# Patient Record
Sex: MALE | Race: BLACK OR AFRICAN AMERICAN | NOT HISPANIC OR LATINO | URBAN - METROPOLITAN AREA
[De-identification: names, ages, dates, MRNs, and addresses within clinical notes are randomized per-mention and may not be internally consistent; named-entity substitution may affect disease eponyms.]

---

## 2023-11-10 PROBLEM — M25.512 CHRONIC LEFT SHOULDER PAIN: Status: ACTIVE | Noted: 2018-03-14

## 2023-11-10 PROBLEM — H02.106 ECTROPION OF LEFT EYE: Status: ACTIVE | Noted: 2023-11-10

## 2023-11-10 PROBLEM — G89.29 CHRONIC LEFT SHOULDER PAIN: Status: ACTIVE | Noted: 2018-03-14

## 2023-11-10 PROBLEM — E66.01 MORBID OBESITY (MULTI): Status: ACTIVE | Noted: 2023-11-10

## 2023-11-10 PROBLEM — S99.921A RIGHT FOOT INJURY: Status: ACTIVE | Noted: 2023-11-10

## 2023-11-10 PROBLEM — H25.11 AGE-RELATED NUCLEAR CATARACT OF RIGHT EYE: Status: ACTIVE | Noted: 2023-11-10

## 2023-11-10 PROBLEM — E66.813 OBESITY, CLASS III, BMI 40-49.9 (MORBID OBESITY): Status: ACTIVE | Noted: 2018-07-23

## 2023-11-10 PROBLEM — E66.01 OBESITY, CLASS III, BMI 40-49.9 (MORBID OBESITY) (MULTI): Status: ACTIVE | Noted: 2018-07-23

## 2023-11-10 PROBLEM — I10 HYPERTENSION: Status: ACTIVE | Noted: 2023-11-10

## 2023-11-10 PROBLEM — S92.009A CALCANEUS FRACTURE: Status: ACTIVE | Noted: 2023-11-10

## 2023-11-10 PROBLEM — K29.70 GASTRITIS WITHOUT BLEEDING: Status: ACTIVE | Noted: 2017-12-07

## 2023-11-10 PROBLEM — H25.12 AGE-RELATED NUCLEAR CATARACT OF LEFT EYE: Status: ACTIVE | Noted: 2023-11-10

## 2023-11-10 PROBLEM — R07.9 CHEST PAIN: Status: ACTIVE | Noted: 2019-05-21

## 2023-11-10 PROBLEM — H10.33 ACUTE CONJUNCTIVITIS OF BOTH EYES: Status: ACTIVE | Noted: 2023-11-10

## 2023-11-10 PROBLEM — L24.0 CONTACT DERMATITIS DUE TO DETERGENT: Status: ACTIVE | Noted: 2023-11-10

## 2023-11-10 PROBLEM — Q14.1 CONGENITAL HYPERTROPHY OF RETINAL PIGMENT EPITHELIUM: Status: ACTIVE | Noted: 2023-11-10

## 2023-11-10 PROBLEM — J01.90 ACUTE NON-RECURRENT SINUSITIS: Status: ACTIVE | Noted: 2023-11-10

## 2023-11-10 PROBLEM — H11.002 PTERYGIUM EYE, LEFT: Status: ACTIVE | Noted: 2017-10-31

## 2023-11-10 PROBLEM — S93.401A RIGHT ANKLE SPRAIN: Status: ACTIVE | Noted: 2023-11-10

## 2023-11-10 PROBLEM — M10.9 GOUT: Status: ACTIVE | Noted: 2017-09-28

## 2023-11-10 PROBLEM — S99.911A INJURY OF RIGHT ANKLE: Status: ACTIVE | Noted: 2023-11-10

## 2023-11-10 RX ORDER — CLONIDINE HYDROCHLORIDE 0.3 MG/1
0.3 TABLET ORAL 2 TIMES DAILY
COMMUNITY
Start: 2023-10-25

## 2023-11-10 RX ORDER — VIT C/E/ZN/COPPR/LUTEIN/ZEAXAN 250MG-90MG
1000 CAPSULE ORAL
COMMUNITY
Start: 2023-10-25

## 2023-11-10 RX ORDER — ASCORBIC ACID 500 MG
500 TABLET ORAL
COMMUNITY
Start: 2023-10-25

## 2023-11-10 RX ORDER — POTASSIUM CHLORIDE 20 MEQ/1
20 TABLET, EXTENDED RELEASE ORAL 2 TIMES DAILY
COMMUNITY
Start: 2020-02-21

## 2023-11-10 RX ORDER — HYDROCHLOROTHIAZIDE 50 MG/1
50 TABLET ORAL
COMMUNITY
Start: 2016-11-22

## 2023-11-10 RX ORDER — ASPIRIN 81 MG/1
81 TABLET ORAL
COMMUNITY
Start: 2017-09-28

## 2023-11-10 RX ORDER — AMLODIPINE BESYLATE 10 MG/1
10 TABLET ORAL
COMMUNITY
Start: 2015-10-15

## 2023-11-13 ENCOUNTER — APPOINTMENT (OUTPATIENT)
Dept: SLEEP MEDICINE | Facility: CLINIC | Age: 63
End: 2023-11-13
Payer: COMMERCIAL

## 2024-04-20 ENCOUNTER — HOSPITAL ENCOUNTER (OUTPATIENT)
Dept: RADIOLOGY | Facility: CLINIC | Age: 64
Discharge: HOME | End: 2024-04-20
Payer: COMMERCIAL

## 2024-04-20 DIAGNOSIS — M54.31 BILATERAL SCIATICA: ICD-10-CM

## 2024-04-20 DIAGNOSIS — M54.32 BILATERAL SCIATICA: ICD-10-CM

## 2024-04-20 PROCEDURE — 72100 X-RAY EXAM L-S SPINE 2/3 VWS: CPT | Performed by: RADIOLOGY

## 2024-04-20 PROCEDURE — 72100 X-RAY EXAM L-S SPINE 2/3 VWS: CPT

## 2024-10-27 ENCOUNTER — OFFICE VISIT (OUTPATIENT)
Dept: URGENT CARE | Age: 64
End: 2024-10-27
Payer: COMMERCIAL

## 2024-10-27 VITALS
OXYGEN SATURATION: 96 % | TEMPERATURE: 97.9 F | DIASTOLIC BLOOD PRESSURE: 73 MMHG | SYSTOLIC BLOOD PRESSURE: 135 MMHG | RESPIRATION RATE: 17 BRPM | HEART RATE: 63 BPM

## 2024-10-27 DIAGNOSIS — G89.29 CHRONIC LEFT-SIDED LOW BACK PAIN WITH LEFT-SIDED SCIATICA: Primary | ICD-10-CM

## 2024-10-27 DIAGNOSIS — M54.42 CHRONIC LEFT-SIDED LOW BACK PAIN WITH LEFT-SIDED SCIATICA: Primary | ICD-10-CM

## 2024-10-27 PROCEDURE — 3075F SYST BP GE 130 - 139MM HG: CPT

## 2024-10-27 PROCEDURE — 99213 OFFICE O/P EST LOW 20 MIN: CPT

## 2024-10-27 PROCEDURE — 3078F DIAST BP <80 MM HG: CPT

## 2024-10-27 RX ORDER — CYCLOBENZAPRINE HCL 10 MG
10 TABLET ORAL NIGHTLY PRN
Qty: 30 TABLET | Refills: 0 | Status: SHIPPED | OUTPATIENT
Start: 2024-10-27 | End: 2024-12-26

## 2024-10-27 ASSESSMENT — ENCOUNTER SYMPTOMS
RESPIRATORY NEGATIVE: 1
ARTHRALGIAS: 1
ACTIVITY CHANGE: 1
LEG PAIN: 1
CARDIOVASCULAR NEGATIVE: 1
BACK PAIN: 1

## 2024-10-27 ASSESSMENT — PAIN SCALES - GENERAL: PAINLEVEL_OUTOF10: 0-NO PAIN

## 2024-10-27 NOTE — PROGRESS NOTES
Subjective   Patient ID: Gustabo Prasad is a 64 y.o. male. They present today with a chief complaint of Leg Pain.    History of Present Illness  A 64-year-old male with multiple comorbidities arrives to the clinic with chief complaint of left-sided lower back pain and leg pain.  The patient reports that this has been ongoing for the last month.  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  He states that when he would sit down he can feel some jolting pains from his left lower back down to his left leg.  He states that is numb and tingling.  When he stands up, he feels a lot better.  He has been using over-the-counter medications with minimal relief.  He reports a history of this and would use his muscle ache machine to help with the spasms.  He reports the last and this happened, he was prescribed Flexeril.  He is here for further evaluation health maintenance.  Leg Pain         Past Medical History  Allergies as of 10/27/2024 - Reviewed 10/27/2024   Allergen Reaction Noted    Valsartan Anaphylaxis 11/18/2015    Citric acid Dermatitis 11/10/2023    Sulfa (sulfonamide antibiotics) Itching 09/26/2013    Citrus and derivatives Rash 11/29/2011       (Not in a hospital admission)       History reviewed. No pertinent past medical history.    History reviewed. No pertinent surgical history.     reports that he has been smoking. He does not have any smokeless tobacco history on file.    Review of Systems  Review of Systems   Constitutional:  Positive for activity change.   HENT: Negative.     Respiratory: Negative.     Cardiovascular: Negative.    Musculoskeletal:  Positive for arthralgias and back pain.       Objective    Vitals:    10/27/24 1751   BP: 135/73   BP Location: Left arm   Patient Position: Sitting   Pulse: 63   Resp: 17   Temp: 36.6 °C (97.9 °F)   TempSrc: Oral   SpO2: 96%     No LMP  for male patient.    Physical Exam  Vitals and nursing note reviewed.   Constitutional:       Appearance: Normal appearance.   HENT:      Head: Normocephalic and atraumatic.      Right Ear: Tympanic membrane normal.      Left Ear: Tympanic membrane normal.      Nose: Nose normal.      Mouth/Throat:      Mouth: Mucous membranes are moist.      Pharynx: Oropharynx is clear.   Eyes:      Extraocular Movements: Extraocular movements intact.      Conjunctiva/sclera: Conjunctivae normal.      Pupils: Pupils are equal, round, and reactive to light.   Cardiovascular:      Rate and Rhythm: Normal rate and regular rhythm.   Pulmonary:      Effort: Pulmonary effort is normal.      Breath sounds: Normal breath sounds.   Abdominal:      General: Bowel sounds are normal.      Palpations: Abdomen is soft.   Musculoskeletal:         General: Normal range of motion.      Cervical back: Normal range of motion and neck supple.   Skin:     General: Skin is warm.      Capillary Refill: Capillary refill takes less than 2 seconds.   Neurological:      General: No focal deficit present.      Mental Status: He is alert and oriented to person, place, and time. Mental status is at baseline.   Psychiatric:         Mood and Affect: Mood normal.         Behavior: Behavior normal.         Thought Content: Thought content normal.         Judgment: Judgment normal.         Procedures    Point of Care Test & Imaging Results from this visit  No results found for this visit on 10/27/24.   No results found.    Diagnostic study results (if any) were reviewed by DERECK Brandon.    Assessment/Plan   Allergies, medications, history, and pertinent labs/EKGs/Imaging reviewed by DERECK Brandon.     Medical Decision Making  S/s of lumbago with sciatica to the left side. No trauma. Likely due to the stagnant desk job, heavy weight, and heavy lifting at home. Stretching exercises as discussed. Otc tylenol and motrin.  Flexeril 10mg at HS  for muscle spasms, follow up with pcp.    As a result of the work-up, the patient was discharged home.  he was informed of his diagnosis and instructed to come back with any concerns or worsening of condition.  he and was agreeable to the plan as discussed above.  he was given the opportunity to ask questions.  All of the patient's questions were answered.    This document was generated using the assistance of voice recognition software. If there are any errors of spelling, grammar, syntax, or meaning; please feel free to contact me directly for clarification.     Orders and Diagnoses  Diagnoses and all orders for this visit:  Chronic left-sided low back pain with left-sided sciatica  -     cyclobenzaprine (Flexeril) 10 mg tablet; Take 1 tablet (10 mg) by mouth as needed at bedtime for muscle spasms.      Medical Admin Record      Patient disposition: Home    Electronically signed by DERECK Brandon  6:08 PM

## 2025-05-18 ENCOUNTER — OFFICE VISIT (OUTPATIENT)
Dept: URGENT CARE | Age: 65
End: 2025-05-18
Payer: COMMERCIAL

## 2025-05-18 VITALS
SYSTOLIC BLOOD PRESSURE: 174 MMHG | RESPIRATION RATE: 20 BRPM | DIASTOLIC BLOOD PRESSURE: 90 MMHG | OXYGEN SATURATION: 97 % | TEMPERATURE: 98 F | HEART RATE: 68 BPM

## 2025-05-18 DIAGNOSIS — I10 HYPERTENSION, UNSPECIFIED TYPE: ICD-10-CM

## 2025-05-18 DIAGNOSIS — R10.9 ABDOMINAL PAIN, UNSPECIFIED ABDOMINAL LOCATION: ICD-10-CM

## 2025-05-18 DIAGNOSIS — S39.011A STRAIN OF ABDOMINAL WALL, INITIAL ENCOUNTER: Primary | ICD-10-CM

## 2025-05-18 LAB
POC APPEARANCE, URINE: CLEAR
POC BILIRUBIN, URINE: NEGATIVE
POC BLOOD, URINE: NEGATIVE
POC COLOR, URINE: ABNORMAL
POC GLUCOSE, URINE: NEGATIVE MG/DL
POC KETONES, URINE: NEGATIVE MG/DL
POC LEUKOCYTES, URINE: ABNORMAL
POC NITRITE,URINE: NEGATIVE
POC PH, URINE: 6 PH
POC PROTEIN, URINE: NEGATIVE MG/DL
POC SPECIFIC GRAVITY, URINE: 1.01
POC UROBILINOGEN, URINE: 0.2 EU/DL

## 2025-05-18 ASSESSMENT — PAIN SCALES - GENERAL: PAINLEVEL_OUTOF10: 6

## 2025-05-18 NOTE — PROGRESS NOTES
Subjective   Patient ID: Gustabo Prasad is a 64 y.o. male. They present today with a chief complaint of Abdominal Pain (For one week).    History of Present Illness  Patient reports that he's been wearing a abdominal safety belt for the past ~2 weeks  Notes discomfort - inferior & left to the belly button - past week  Reports symptoms are worse when he flexes his abdomen forward  Denies nausea, vomiting  Denies dysuria  Notes sometimes having inconsistent BM, prune juice typically helps  Doesn't take his blood pressure medication every day because he doesn't like how it makes him feel      Past Medical History  Allergies as of 05/18/2025 - Reviewed 05/18/2025   Allergen Reaction Noted    Valsartan Anaphylaxis 11/18/2015    Citric acid Dermatitis 11/10/2023    Sulfa (sulfonamide antibiotics) Itching 09/26/2013    Citrus and derivatives Rash 11/29/2011       Prescriptions Prior to Admission[1]     Medical History[2]    Surgical History[3]     reports that he has been smoking. He does not have any smokeless tobacco history on file.                               Objective    Vitals:    05/18/25 1921   BP: 174/90   BP Location: Left arm   Patient Position: Sitting   Pulse: 68   Resp: 20   Temp: 36.7 °C (98 °F)   TempSrc: Oral   SpO2: 97%     No LMP for male patient.    Physical Exam  Constitutional:       General: He is not in acute distress.     Appearance: Normal appearance. He is not toxic-appearing or diaphoretic.   HENT:      Nose: No rhinorrhea.   Eyes:      General: No scleral icterus.        Right eye: No discharge.         Left eye: No discharge.      Extraocular Movements: Extraocular movements intact.   Pulmonary:      Effort: Pulmonary effort is normal.   Abdominal:      General: There is no distension.      Palpations: Abdomen is soft. There is no mass.      Tenderness: There is no guarding.       Musculoskeletal:      Cervical back: Normal range of motion.   Neurological:      Mental Status: He is alert.    Psychiatric:         Mood and Affect: Mood normal.         Behavior: Behavior normal.         Thought Content: Thought content normal.      Comments: Pleasant         Procedures    Point of Care Test & Imaging Results from this visit:      Diagnostic study results (if any) were reviewed by Andre Reyna MD.    Assessment/Plan   Allergies, medications, history, and pertinent labs/EKGs/Imaging reviewed by Andre Reyna MD.     Medical Decision Making:    ***    Orders and Diagnoses  There are no diagnoses linked to this encounter.    Patient disposition: { Disposition:38027}      Medical Admin Record      Follow Up Instructions  No follow-ups on file.    Electronically signed by Andre Reyna MD  7:26 PM             [1] (Not in a hospital admission)  [2] History reviewed. No pertinent past medical history.  [3] History reviewed. No pertinent surgical history.

## 2025-05-20 LAB — BACTERIA UR CULT: NORMAL
